# Patient Record
Sex: FEMALE | Race: WHITE
[De-identification: names, ages, dates, MRNs, and addresses within clinical notes are randomized per-mention and may not be internally consistent; named-entity substitution may affect disease eponyms.]

---

## 2017-06-04 NOTE — EDPHY
H & P


Constitutional: 


 Initial Vital Signs











Temperature (C)  36.6 C   06/04/17 16:00


 


Heart Rate  106 H  06/04/17 16:00


 


Respiratory Rate  24 H  06/04/17 16:00


 


Blood Pressure  116/83 H  06/04/17 16:00


 


O2 Sat (%)  94   06/04/17 16:00








 











O2 Delivery Mode               Room Air

















Medical Decision Making


ED Course/Re-evaluation: 





CHIEF COMPLAINT:  Psychiatric evaluation





HISTORY OF PRESENT ILLNESS:  The patient is a 38 y/o female requesting mental 

health and medical evaluation complaining of "stomach contractions" after 

walking in the woods for several days. She is unable to clearly tell me what 

she means by this and states, "check your records, I'm nuts." She will not tell 

me what psychiatric diagnoses she has or if she is on medication and has not 

been here before. She says she was walking in the woods "looking for friends 

but I don't fucking have any." She denies homicidal or suicidal ideation. I am 

unable to obtain further history. 





REVIEW OF SYSTEMS:  





Unable to obtain as patient is a poor historian.





PHYSICAL EXAM:  





General Appearance:  Alert, well hydrated, yelling at staff, and non-toxic 

appearing.


Head:  Atraumatic without scalp tenderness or obvious injury


Eyes:  Pupils equal, round, reactive to light and accommodation, EOMI, no trauma

, no injection.


Nose:  Atraumatic, no rhinorrhea, clear.


Throat:  mucus membranes moist.


Neck:  Supple, nontender, no lymphadenopathy.


Respiratory:  No retractions, no distress, no wheezes, and no accessory muscle 

use.  Lungs are clear to auscultation bilaterally.


Cardiovascular:  Regular rate and rhythm, no murmurs, rubs, or gallops. Good 

capillary refill all extremities.


Gastrointestinal:  Abdomen is soft, nontender, non-distended, no masses, no 

rebound, no guarding, no peritoneal signs.


Musculoskeletal:  Normal active ROM of all extremities, atraumatic.


Neurological:  Alert, appropriate, and interactive.  Nonfocal neuro exam


Skin:  No rashes, good turgor, no nodules on palpation.





Past medical history: Unable to obtain


Past surgical history: Unable to obtain


Family history: Unable to obtain


Social history: Suspect homelessness.





DIFFERENTIAL DIAGNOSIS:   The differential diagnosis for the patient's 

depression included but was not limited to functional and major depression, 

situational depression, medication side effect, drugs, and alcohol abuse.





MEDICAL DECISION MAKING:  


Patient is in no acute distress and is hemodynamically stable. She has a soft, 

benign abdomen. Standard psychiatric labs obtained. 





Patient has been aggressive and uncooperative with staff throughout her time 

here. She has been unwilling to provide urine sample or cooperate with staff in 

any other way. She is not homicidal or suicidal. She is refusing any further 

evaluation or care here and has walked out of the department. Staff provided 

resources for her to follow up with [facility] as needed. 





- Data Points


Laboratory Results: 


 Laboratory Results





 06/04/17 16:20 





 06/04/17 16:20 





 











  06/04/17 06/04/17 06/04/17





  16:20 16:20 16:20


 


WBC      10.46 10^3/uL H 10^3/uL





     (3.80-9.50) 


 


RBC      4.45 10^6/uL 10^6/uL





     (4.18-5.33) 


 


Hgb      14.6 g/dL g/dL





     (12.6-16.3) 


 


Hct      42.2 % %





     (38.0-47.0) 


 


MCV      94.8 fL fL





     (81.5-99.8) 


 


MCH      32.8 pg pg





     (27.9-34.1) 


 


MCHC      34.6 g/dL g/dL





     (32.4-36.7) 


 


RDW      12.7 % %





     (11.5-15.2) 


 


Plt Count      444 10^3/uL H 10^3/uL





     (150-400) 


 


MPV      9.5 fL fL





     (8.7-11.7) 


 


Neut % (Auto)      65.0 % %





     (39.3-74.2) 


 


Lymph % (Auto)      23.7 % %





     (15.0-45.0) 


 


Mono % (Auto)      9.6 % %





     (4.5-13.0) 


 


Eos % (Auto)      0.6 % %





     (0.6-7.6) 


 


Baso % (Auto)      0.6 % %





     (0.3-1.7) 


 


Nucleat RBC Rel Count      0.0 % %





     (0.0-0.2) 


 


Absolute Neuts (auto)      6.81 10^3/uL H 10^3/uL





     (1.70-6.50) 


 


Absolute Lymphs (auto)      2.48 10^3/uL 10^3/uL





     (1.00-3.00) 


 


Absolute Monos (auto)      1.00 10^3/uL H 10^3/uL





     (0.30-0.80) 


 


Absolute Eos (auto)      0.06 10^3/uL 10^3/uL





     (0.03-0.40) 


 


Absolute Basos (auto)      0.06 10^3/uL 10^3/uL





     (0.02-0.10) 


 


Absolute Nucleated RBC      0.00 10^3/uL 10^3/uL





     (0-0.01) 


 


Immature Gran %      0.5 % %





     (0.0-1.1) 


 


Immature Gran #      0.05 10^3/uL 10^3/uL





     (0.00-0.10) 


 


Sodium    137 mEq/L mEq/L  





    (134-144)  


 


Potassium    4.1 mEq/L mEq/L  





    (3.5-5.2)  


 


Chloride    104 mEq/L mEq/L  





    ()  


 


Carbon Dioxide    24 mEq/l mEq/l  





    (22-31)  


 


Anion Gap    9 mEq/L mEq/L  





    (8-16)  


 


BUN    7 mg/dL mg/dL  





    (7-23)  


 


Creatinine    0.7 mg/dL mg/dL  





    (0.6-1.0)  


 


Estimated GFR    > 60   





    


 


Glucose    81 mg/dL mg/dL  





    ()  


 


Calcium    9.5 mg/dL mg/dL  





    (8.5-10.4)  


 


Beta HCG, Qual  NEGATIVE     





    


 


Salicylates    < 1.0 mg/dL L mg/dL  





    (2.0-20.0)  


 


Acetaminophen    < 10 mcg/mL L mcg/mL  





    (10.0-30.0)  


 


Ethyl Alcohol    < 10 mg/dL mg/dL  





    (0-10)  














Departure





- Departure


Disposition: Home, Routine, Self-Care


Clinical Impression: 


 Anxiety





Condition: Good


Instructions:  Anxiety (ED)


Additional Instructions: 


Follow up with your primary care provider for unimproved symptoms. Return to 

the ED for worsening of condition.


Referrals: 


Patient,NotPresent [Primary Care Provider] - As per Instructions


[facility],. [Clinic] - As per Instructions


Report Scribed for: Fernando Hawkins


Report Scribed by: Judie Horner


Date of Report: 06/04/17


Time of Report: 16:02
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
## 2017-06-14 NOTE — EDPHY
H & P


Smoking Status: Current every day smoker


Time Seen by Provider: 06/14/17 13:51


HPI/ROS: 





CHIEF COMPLAINT:  M1 hold





HISTORY OF PRESENT ILLNESS:  37-year-old female presents to the emergency 

department with the [facility]  on an M1 hold.  Patient was at the 

library and acting out.  She needed to be restrained was brought to the 

hospital for evaluation.  Upon reviewing her records, the patient has a history 

of bipolar with a long history of noncompliance with her medications.  She was 

recently hospitalized with mental illness end of May 2017. Patient tells me 

that she is not suicidal or homicidal.  She states "I would never do that."  

Currently the patient denies any physical complaints.





REVIEW OF SYSTEMS:


Constitutional:  No fever, no chills.


Eyes:  No double or blurry vision.


ENT:  No sore throat.


Respiratory:  No cough, no shortness of breath.


Cardiac:  No chest pain.


Gastrointestinal:  No abdominal pain, vomiting or diarrhea.


Genitourinary:  No dysuria.


Musculoskeletal:  No neck or back pain.


Skin:  No rashes.


Neurological:  No headache. (TheodoreZaynab DEVANG)


Past Medical/Surgical History: 





Bipolar noncompliant with medication, marijuana use (Lluvia Jaimesa DEVANG)


Social History: 





Homeless from [place] (Crystal Jaimesyamilet SIDDIQI)


Physical Exam: 





General Appearance:  Alert, yelling, pressured speech.


Eyes:  Pupils equal and round.  Patient refused exam.


ENT:  Patient refused exam


Respiratory: No respiratory distress. The patient refused auscultation


Cardiovascular:  Regular rate and rhythm.


Gastrointestinal:  Abdomen is soft and nontender, no masses, no rebound or 

guarding, bowel sounds normal.


Neurological:  Uncooperative, cannot determine.


Skin:  Patient refused exam


Musculoskeletal:  Patient refused exam.  Neck is supple


Extremities:  Full range of motion and no peripheral edema.


Psychiatric:  Very agitated (Zaynab Jaimes)


Constitutional: 





 Initial Vital Signs











Temperature (C)  37.2 C   06/14/17 13:53


 


Heart Rate  113 H  06/14/17 13:53


 


Respiratory Rate  20   06/14/17 13:53


 


Blood Pressure  97/63 L  06/14/17 13:53


 


O2 Sat (%)  93   06/14/17 13:53








 











O2 Delivery Mode               Room Air














Allergies/Adverse Reactions: 


 





haloperidol [From Haldol] Allergy (Verified 06/07/17 20:27)


 


antibiotics Allergy (Uncoded 06/07/17 20:27)


 








Home Medications: 














 Medication  Instructions  Recorded


 


Probiotic  06/07/17














Medical Decision Making


ED Course/Re-evaluation: 





I did speak with her friend Caleb who is a registered nurse and also the patient'

s .  She tells me that patient was recently hospitalized and was 

seen she thought initially at [facility].  She has a history of bipolar.  

She refuses medications.





CORHIO was consulted and the patient was seen at [facility] on 5/ 26/2017 where she was admitted for bipolar.  She was evaluated by the 

psychiatrist who recommended lithium and olanzapine, however the patient 

refused and was discharged against medical advice.





Laboratory studies are unremarkable.  We are still awaiting urinalysis for 

urine tox screen.  Mental health is aware. (Zaynab Jaimes)





1700:  Patient is signed out to me at change of shift by Dr. Zaynab Jaimes.  At 

that time the patient was in her room.





18 20:  I when re-evaluated the patient.  She was agitated.  She was given a 

new blanket.  I discussed the plan with the patient answered all her questions.

  She was offered oral Zyprexa.





1845:  The patient is doing much better.  She is lying comfortably in her room.





2250: EMTALA completed.  Patient is stable for transfer.





2325:  The patient is signed out to Dr. Christianson at change of shift.  Per report

, the patient will be transferred at midnight. (Antonette Bowers)





The patient became agitated again upon transfer.  She did not want to be 

transferred to another hospital and became upset, yelling and screaming.  She 

required another dose of Zyprexa IM 10 mg for transport. (Jazz Christianson)


Differential Diagnosis: 





Depression including functional and major depression, situational depression, 

medication side effect, drugs and alcohol abuse. (Zaynab Jaimes)


Care Turn Over: 





Care will be turned over to Dr. Antonette Bowers for disposition and plan. (Zaynab Jaimes)





- Data Points


Laboratory Results: 





 Laboratory Results





 06/14/17 14:30 





 06/14/17 14:30 








Medications Given: 





 








Discontinued Medications





Olanzapine (Olanzapine)  10 mg PO ONCE ONE


   Stop: 06/14/17 18:19


   Last Admin: 06/14/17 18:31 Dose:  Not Given


Olanzapine (Zyprexa Im Injection)  10 mg IM EDNOW ONE


   Stop: 06/14/17 18:32


   Last Admin: 06/14/17 18:31 Dose:  10 mg


Olanzapine (Zyprexa Im Injection)  10 mg IM EDNOW ONE


   Stop: 06/15/17 02:14


   Last Admin: 06/15/17 02:14 Dose:  10 mg








Departure





- Departure


Disposition: [facility] IP


Clinical Impression: 


 Bipolar, noncompliant with medications





Condition: Good


Referrals: 


NONE *PRIMARY CARE P,. [Primary Care Provider] - As per Instructions

## 2017-06-25 NOTE — EDPHY
H & P


Stated Complaint: abd cramps/feet hurt


Time Seen by Provider: 06/25/17 07:29


HPI/ROS: 





Chief Complaint:  Abdominal cramping, foot pain





HPI:  37-year-old woman with a history of bipolar disorder and homelessness 

presenting this morning complaining of abdominal cramping and lower foot pain. 

This is the patient's 3rd visit to this emergency department this month.  Prior 

to admissions have been for depression and being gravely disabled.  Patient 

states she was in [place] last week.  That was when she had her last 

menstrual cycle.  Patient is complaining of both upper epigastric pain and 

lower midline pain. No urinary urgency or frequency. No nausea or vomiting. No 

diarrhea.  No melena.  Patient does not believe that she is pregnant.  She is 

also complaining of pain around her right foot because she has been walking a 

lot.  Denies any redness or discharge. No fevers or chills. No lightheadedness 

or fainting.  Patient denies being suicidal or homicidal at this time.  She has 

not been compliant with her recommended medications.  She states she will not 

start these until she has a chest to start therapy.  





ROS:  10 point Review of Systems is negative except as noted in the HPI.





PMH:  Bipolar disorder





Social History:  Positive smoking, no alcohol,  no recreational drug use





Family History: non-contributory





Physical Exam:


Gen: Awake, Alert, No Distress


HEENT:  


     Nose: no rhinorrhea


     Eyes: PERRLA, EOMI


     Mouth: Moist mucosa 


Neck: Supple, no JVD


Chest: nontender, lungs clear to auscultation


Heart: S1, S2 normal, no murmur


Abd: Soft, non-tender, no guarding (distractible exam.  On initial evaluation 

she was guarding her pelvis however with distraction her abdomen is soft 

nontender)


Back: no CVA tenderness, no midline tenderness 


Ext: no edema, non-tender, there is a rupture bolus on the plantar aspect of 

her left great toe.  There is no erythema.  There is no discharge. Extremity 

exam is otherwise unremarkable


Skin: no rash


Neuro: CN II-XII intact, Sensation grossly intact, Strength 5/5 in bilateral 

upper and lower extremities








- Personal History


LMP (Females 10-55): 1-7 Days Ago


Current Tetanus/Diphtheria Vaccine: Unsure





- Medical/Surgical History


Hx Asthma: No


Hx Chronic Respiratory Disease: No


Hx Diabetes: No


Hx Cardiac Disease: No


Hx Renal Disease: No


Hx Cirrhosis: No


Hx Alcoholism: No


Hx HIV/AIDS: No


Hx Splenectomy or Spleen Trauma: No


Other PMH: colitis, skizophrenia.





- Social History


Smoking Status: Current every day smoker


Constitutional: 


 Initial Vital Signs











Temperature (C)  36.8 C   06/25/17 07:24


 


Heart Rate  92   06/25/17 07:24


 


Respiratory Rate  18   06/25/17 07:24


 


Blood Pressure  136/93 H  06/25/17 07:24


 


O2 Sat (%)  92   06/25/17 07:24








 











O2 Delivery Mode               Room Air














Allergies/Adverse Reactions: 


 





haloperidol [From Haldol] Allergy (Verified 06/25/17 07:22)


 


antibiotics Allergy (Uncoded 06/07/17 20:27)


 








Home Medications: 














 Medication  Instructions  Recorded


 


NK [No Known Home Meds]  06/25/17














Medical Decision Making


ED Course/Re-evaluation: 





Patient is feeling improved after GI cocktail.  Urinalysis is negative for 

signs of infection at this time.  She has no hematuria.  She is not pregnant.  

She has a soft benign abdomen.  Her feet do not have any appearance of 

infection or deformity.  Ruptured blister on her left great toe was debrided by 

me.  It was dressed with ointment and a Band-Aid.  Skin is clean dry and 

intact.  Patient will be discharged with referral for the [facility] for 

follow-up, return for worsening.





- Data Points


Laboratory Results: 


 











  06/25/17 06/25/17





  08:19 08:19


 


Urine Color  YELLOW   





   


 


Urine Appearance  HAZY   





   


 


Urine pH  5.0   





   (5.0-7.5)  


 


Ur Specific Gravity  1.032  H   





   (1.002-1.030)  


 


Urine Protein  NEGATIVE   





   (NEGATIVE)  


 


Urine Ketones  1+  H   





   (NEGATIVE)  


 


Urine Blood  NEGATIVE   





   (NEGATIVE)  


 


Urine Nitrate  NEGATIVE   





   (NEGATIVE)  


 


Urine Bilirubin  NEGATIVE   





   (NEGATIVE)  


 


Urine Urobilinogen  2.0 EU H EU  





   (0.2-1.0)  


 


Ur Leukocyte Esterase  NEGATIVE   





   (NEGATIVE)  


 


Urine Glucose  NEGATIVE   





   (NEGATIVE)  


 


Urine Pregnancy Test    NEGATIVE 





   











Medications Given: 


 








Discontinued Medications





Al Hydroxide/Mg Hydroxide (Maalox Susp)  30 ml PO ONCE ONE


   Stop: 06/25/17 07:40


   Last Admin: 06/25/17 07:51 Dose:  30 ml


Lidocaine (Lidocaine 2% Viscous)  15 ml PO ONCE ONE


   Stop: 06/25/17 07:40


   Last Admin: 06/25/17 07:51 Dose:  15 ml








Departure





- Departure


Disposition: Home, Routine, Self-Care


Clinical Impression: 


 Abdominal pain, Blister





Condition: Good


Instructions:  Abdominal Pain (ED), Blister (ED)


Additional Instructions: 


Follow up with [facility] in 2-3 days for re-evaluation.


Please consider taking the recommended medications for your depression.


Return to the emergency depart for fevers, chills, uncontrolled nausea, vomiting

, or any other concerns.


Referrals: 


[facility],. [Clinic] - As per Instructions
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
## 2018-05-13 NOTE — EDPHY
H & P


Stated Complaint: R foot pain/ankle pain, "hurts top of my foot to my shin"


Time Seen by Provider: 05/12/18 23:00


HPI/ROS: 





Chief complaint:  Right foot pain





History of present illness:  This is a 38-year-old female who presents to the 

emergency department for right foot pain.  She is homeless.  She has been 

walking lengthy distances over the last few days.  She has started developed 

pain on top of her foot that radiates up the leg.  She has had this problem 

multiple times in the past.  It is worse with weight-bearing.  No report of 

direct trauma.  No abnormal coolness or paresthesias in the foot.  She takes 

Tylenol which only mildly helps the pain.  She does not tolerate NSAIDs or 

narcotics.





- Personal History


LMP (Females 10-55): 1-7 Days Ago


Current Tetanus Diphtheria and Acellular Pertussis (TDAP): No





- Medical/Surgical History


Hx Asthma: No


Hx Chronic Respiratory Disease: No


Hx Diabetes: No


Hx Cardiac Disease: No


Hx Renal Disease: No


Hx Cirrhosis: No


Hx Alcoholism: No


Hx HIV/AIDS: No


Hx Splenectomy or Spleen Trauma: No


Other PMH: colitis, schizophrenia, hernia surgery





- Social History


Smoking Status: Current every day smoker





- Physical Exam


Exam: 





General:  Alert, nontoxic


Skin:  No lesions consistent with acute trauma to the right foot


Musculoskeletal:  Tenderness over the dorsum of the foot.  The ankle including 

over the Achilles, and lower leg are nontender.  She can move the digits of the 

foot and the ankle well.


Vascular:  DP and PT pulses 2+.


Neurologic:  Sensation intact in the right foot.


Constitutional: 


 Initial Vital Signs











Temperature (C)  36.8 C   05/12/18 22:49


 


Heart Rate  97   05/12/18 22:49


 


Respiratory Rate  18   05/12/18 22:49


 


Blood Pressure  116/72   05/12/18 22:49


 


O2 Sat (%)  97   05/12/18 22:49








 











O2 Delivery Mode               Room Air














Allergies/Adverse Reactions: 


 





haloperidol [From Haldol] Allergy (Verified 05/12/18 22:53)


 


antibiotics Allergy (Uncoded 12/27/17 21:46)


 








Home Medications: 














 Medication  Instructions  Recorded


 


NK [No Known Home Meds]  06/25/17














Medical Decision Making





- Diagnostics


Imaging Results: 


 Imaging Impressions





Foot X-Ray  05/12/18 23:30


Impression:


1. Spur suspected at the first tarsometatarsal joint on the top of the foot 

seen on lateral view. No definite fracture seen.











Imaging: I viewed and interpreted images myself


ED Course/Re-evaluation: 





Patient seen under the supervision of my secondary supervising physician Dr. João Hernández.  Patient presents to the emergency department for right foot 

pain.  Mild tenderness over the dorsum of the foot.  She is still moving it 

well and able to ambulate.  X-ray is negative.  I have offered a walking boot 

and crutches, she has declined.  She is asked to follow up with a primary care 

doctor for recheck.  Referral information is provided.  Return precautions are 

given.


Differential Diagnosis: 





Included but not limited to contusion, sprain or strain, bony fracture 

including stress fracture





- Data Points


Medications Given: 


 








Discontinued Medications





Acetaminophen (Tylenol)  650 mg PO EDNOW ONE


   Stop: 05/13/18 00:24


   Last Admin: 05/13/18 00:26 Dose:  650 mg








Departure





- Departure


Disposition: Home, Routine, Self-Care


Clinical Impression: 


 Right foot pain





Condition: Good


Instructions:  Metatarsalgia (DC)


Additional Instructions: 


Follow-up with a primary care doctor for recheck





If symptoms worsen or new symptoms develop return to the emergency room for 

recheck


Referrals: 


NONE *PRIMARY CARE P,. [Primary Care Provider] - As per Instructions


[facility],. [Clinic] - As per Instructions

## 2018-07-13 ENCOUNTER — HOSPITAL ENCOUNTER (EMERGENCY)
Dept: HOSPITAL 80 - FED | Age: 38
Discharge: HOME | End: 2018-07-13
Payer: MEDICAID

## 2018-07-13 VITALS — DIASTOLIC BLOOD PRESSURE: 57 MMHG | SYSTOLIC BLOOD PRESSURE: 93 MMHG

## 2018-07-13 DIAGNOSIS — Z3A.09: ICD-10-CM

## 2018-07-13 DIAGNOSIS — E86.9: ICD-10-CM

## 2018-07-13 DIAGNOSIS — O20.0: Primary | ICD-10-CM

## 2018-07-13 DIAGNOSIS — F17.200: ICD-10-CM

## 2018-07-13 LAB — PLATELET # BLD: 397 10^3/UL (ref 150–400)

## 2018-07-13 NOTE — EDPHY
H & P


Stated Complaint: 9 WKS PREGNANT, LOWER ABD PAIN, VAG BLEEDING


Time Seen by Provider: 07/13/18 02:29


HPI/ROS: 





HPI





CHIEF COMPLAINT:  9 weeks pregnant, vaginal spotting.





HISTORY OF PRESENT ILLNESS:  38-year-old female, presents emergency room she 

states she is 9 weeks pregnant,, having vaginal bleeding with lower pelvic 

cramping.  She states she was having low spotting earlier today.  The bleeding 

and cramping got worse this evening, lower abdominal cramping.  Denies any 

chest pain or shortness of breath.  Denies significant vaginal bleeding.  This 

is her 1st pregnancy.  She reports to me she has already had an ultrasound that 

shows an IUP.





Past Medical History:  Denies significant medical history





Past Surgical History:  Denies significant surgical history





Social History:  Denies drugs alcohol tobacco.  Homeless.  Occasional marijuana 

use.





Family History:  Noncontributory.








ROS   


REVIEW OF SYSTEMS:


A comprehensive 10 point review of systems is otherwise negative aside from 

elements mentioned in the history of present illness.








Exam   


Constitutional nontoxic appearing in no acute distress,  triage nursing summary 

reviewed, vital signs reviewed, awake/alert. 


Eyes   normal conjunctivae and sclera, EOMI, PERRLA. 


HENT   normal inspection, atraumatic, moist mucus membranes, no epistaxis, neck 

supple/ no meningismus, no raccoon eyes. 


Respiratory   clear to auscultation bilaterally, normal breath sounds, no 

respiratory distress, no wheezing. 


Cardiovascular   rate normal, regular rhythm, no murmur, no edema, distal 

pulses normal. 


Gastrointestinal   soft, non-tender, no rebound, no guarding, normal bowel 

sounds, no distension, no pulsatile mass. 


Genitourinary   no CVA tenderness. 


Musculoskeletal  no midline vertebral tenderness, full range of motion, no calf 

swelling, no tenderness of extremities, no meningismus, good pulses, 

neurovascularly intact.


Skin   pink, warm, & dry, no rash, skin atraumatic. 


Neurologic   awake, alert and oriented x 3, AAOx3, moves all 4 extremities 

equally, motor intact, sensory intact, CN II-XII intact, normal cerebellar, 

normal vision, normal speech. 


Psychiatric   normal mood/affect. 


Heme/Lymph/Immune   no lymphadenopathy.





Differential Diagnosis:  Includes but is not limited to in a particular order 

threatened miscarriage, ectopic pregnancy, per bleeding in first-trimester 

pregnancy.  UTI.  Dehydration.





Medical Decision Making:  Plan for this, IV establishment, basic blood work, 

pelvic ultrasound, ABO Rh, beta quant.  Re-evaluate.  UA.





Re-evaluation:








Ultrasound of the pelvis called with Dr. Wu, shows a endometrium 11 mm, 

some hyper enhancement concerning for some mild amounts of retained products 

however patient still actively bleeding.  Otherwise good flow to both ovaries.  

No evidence of ectopic.  Most likely completing miscarriage.





0352:  Patient re-evaluated resting comfortably.  No significant bleeding.  I 

reviewed her ultrasound with her.  Discussed test results with her.  She 

understands.  I have referred her to OBGYN.





Additionally discussed return precautions with her she understands return if 

she develops worsening bleeding, cramping, worsening symptoms.  She understands.


Source: Patient





- Personal History


LMP (Females 10-55): Pregnant


Current Tetanus Diphtheria and Acellular Pertussis (TDAP): Unsure





- Medical/Surgical History


Hx Asthma: No


Hx Chronic Respiratory Disease: No


Hx Diabetes: No


Hx Cardiac Disease: No


Hx Renal Disease: No


Hx Cirrhosis: No


Hx Alcoholism: No


Hx HIV/AIDS: No


Hx Splenectomy or Spleen Trauma: No


Other PMH: colitis, schizophrenia, hernia surgery





- Social History


Smoking Status: Current every day smoker


Constitutional: 


 Initial Vital Signs











Temperature (C)  36.7 C   07/13/18 02:22


 


Heart Rate  68   07/13/18 02:22


 


Respiratory Rate  16   07/13/18 02:22


 


Blood Pressure  129/88 H  07/13/18 02:22


 


O2 Sat (%)  97   07/13/18 02:22








 











O2 Delivery Mode               Room Air














Allergies/Adverse Reactions: 


 





haloperidol [From Haldol] Allergy (Verified 05/12/18 22:53)


 


antibiotics Allergy (Uncoded 12/27/17 21:46)


 








Home Medications: 














 Medication  Instructions  Recorded


 


NK [No Known Home Meds]  06/25/17














Medical Decision Making





- Data Points


Laboratory Results: 


 Laboratory Results





 07/13/18 02:22 





 07/13/18 02:22 





 











  07/13/18 07/13/18 07/13/18





  02:57 02:22 02:22


 


WBC      14.05 10^3/uL H 10^3/uL





     (3.80-9.50) 


 


RBC      4.86 10^6/uL 10^6/uL





     (4.18-5.33) 


 


Hgb      15.8 g/dL g/dL





     (12.6-16.3) 


 


Hct      46.0 % %





     (38.0-47.0) 


 


MCV      94.7 fL fL





     (81.5-99.8) 


 


MCH      32.5 pg pg





     (27.9-34.1) 


 


MCHC      34.3 g/dL g/dL





     (32.4-36.7) 


 


RDW      12.7 % %





     (11.5-15.2) 


 


Plt Count      397 10^3/uL 10^3/uL





     (150-400) 


 


MPV      9.1 fL fL





     (8.7-11.7) 


 


Neut % (Auto)      60.6 % %





     (39.3-74.2) 


 


Lymph % (Auto)      30.1 % %





     (15.0-45.0) 


 


Mono % (Auto)      5.6 % %





     (4.5-13.0) 


 


Eos % (Auto)      2.8 % %





     (0.6-7.6) 


 


Baso % (Auto)      0.4 % %





     (0.3-1.7) 


 


Nucleat RBC Rel Count      0.0 % %





     (0.0-0.2) 


 


Absolute Neuts (auto)      8.52 10^3/uL H 10^3/uL





     (1.70-6.50) 


 


Absolute Lymphs (auto)      4.23 10^3/uL H 10^3/uL





     (1.00-3.00) 


 


Absolute Monos (auto)      0.78 10^3/uL 10^3/uL





     (0.30-0.80) 


 


Absolute Eos (auto)      0.39 10^3/uL 10^3/uL





     (0.03-0.40) 


 


Absolute Basos (auto)      0.06 10^3/uL 10^3/uL





     (0.02-0.10) 


 


Absolute Nucleated RBC      0.00 10^3/uL 10^3/uL





     (0-0.01) 


 


Immature Gran %      0.5 % %





     (0.0-1.1) 


 


Immature Gran #      0.07 10^3/uL 10^3/uL





     (0.00-0.10) 


 


Sodium    138 mEq/L mEq/L  





    (135-145)  


 


Potassium    3.9 mEq/L mEq/L  





    (3.3-5.0)  


 


Chloride    106 mEq/L mEq/L  





    ()  


 


Carbon Dioxide    23 mEq/l mEq/l  





    (22-31)  


 


Anion Gap    9 mEq/L mEq/L  





    (8-16)  


 


BUN    8 mg/dL mg/dL  





    (7-23)  


 


Creatinine    0.6 mg/dL mg/dL  





    (0.6-1.0)  


 


Estimated GFR    > 60   





    


 


Glucose    95 mg/dL mg/dL  





    ()  


 


Calcium    9.4 mg/dL mg/dL  





    (8.5-10.4)  


 


Total Bilirubin    0.6 mg/dL mg/dL  





    (0.1-1.4)  


 


Conjugated Bilirubin    0.1 mg/dL mg/dL  





    (0.0-0.5)  


 


Unconjugated Bilirubin    0.5 mg/dL mg/dL  





    (0.0-1.1)  


 


AST    22 IU/L IU/L  





    (14-46)  


 


ALT    25 IU/L IU/L  





    (9-52)  


 


Alkaline Phosphatase    52 IU/L IU/L  





    ()  


 


Total Protein    6.8 g/dL g/dL  





    (6.3-8.2)  


 


Albumin    4.2 g/dL g/dL  





    (3.5-5.0)  


 


Lipase    230 IU/L IU/L  





    ()  


 


Beta HCG, Quant    63838.00 mIU/mL H mIU/mL  





    (0.00-4.83)  


 


Urine Color  RED     





    


 


Urine Appearance  MODERATELY TURBID     





    


 


Urine pH  6.0     





   (5.0-7.5)   


 


Ur Specific Gravity  1.016     





   (1.002-1.030)   


 


Urine Protein  2+  H     





   (NEGATIVE)   


 


Urine Ketones  NEGATIVE     





   (NEGATIVE)   


 


Urine Blood  3+  H     





   (NEGATIVE)   


 


Urine Nitrate  NEGATIVE     





   (NEGATIVE)   


 


Urine Bilirubin  NEGATIVE     





   (NEGATIVE)   


 


Urine Urobilinogen  NEGATIVE EU EU    





   (0.2-1.0)   


 


Ur Leukocyte Esterase  NEGATIVE     





   (NEGATIVE)   


 


Urine RBC   /hpf H /hpf    





   (0-3)   


 


Urine WBC   /hpf H /hpf    





   (0-3)   


 


Ur Epithelial Cells  NONE SEEN /lpf /lpf    





   (NONE-1+)   


 


Urine Glucose  NEGATIVE     





   (NEGATIVE)   











Medications Given: 


 








Discontinued Medications





Sodium Chloride (Ns)  1,000 mls @ 0 mls/hr IV EDNOW ONE; Wide Open


   PRN Reason: Protocol


   Stop: 07/13/18 02:30


   Last Admin: 07/13/18 02:37 Dose:  1,000 mls








Departure





- Departure


Disposition: Home, Routine, Self-Care


Clinical Impression: 


 Threatened miscarriage





Condition: Good


Instructions:  Threatened Miscarriage (ED)


Additional Instructions: 


1. Return to the emergency room if you have worsening bleeding, abdominal pain, 

fever vomiting.


2. Please follow up with OBGYN.


Referrals: 


NONE *PRIMARY CARE P,. [Primary Care Provider] - As per Instructions


Carter Garcia MD [Medical Doctor] - As per Instructions

## 2019-06-02 ENCOUNTER — HOSPITAL ENCOUNTER (INPATIENT)
Dept: HOSPITAL 80 - FED | Age: 39
LOS: 4 days | Discharge: HOME | End: 2019-06-06
Payer: COMMERCIAL